# Patient Record
Sex: FEMALE
[De-identification: names, ages, dates, MRNs, and addresses within clinical notes are randomized per-mention and may not be internally consistent; named-entity substitution may affect disease eponyms.]

---

## 2022-01-21 ENCOUNTER — APPOINTMENT (OUTPATIENT)
Dept: OTOLARYNGOLOGY | Facility: CLINIC | Age: 27
End: 2022-01-21

## 2023-10-10 ENCOUNTER — EMERGENCY (EMERGENCY)
Facility: HOSPITAL | Age: 28
LOS: 1 days | Discharge: HOME | End: 2023-10-10
Attending: EMERGENCY MEDICINE
Payer: COMMERCIAL

## 2023-10-10 VITALS
SYSTOLIC BLOOD PRESSURE: 132 MMHG | RESPIRATION RATE: 20 BRPM | HEART RATE: 106 BPM | TEMPERATURE: 98 F | DIASTOLIC BLOOD PRESSURE: 68 MMHG | OXYGEN SATURATION: 97 %

## 2023-10-10 VITALS
OXYGEN SATURATION: 97 % | SYSTOLIC BLOOD PRESSURE: 149 MMHG | HEART RATE: 132 BPM | HEIGHT: 65 IN | TEMPERATURE: 98 F | RESPIRATION RATE: 22 BRPM | DIASTOLIC BLOOD PRESSURE: 99 MMHG | WEIGHT: 130.07 LBS

## 2023-10-10 LAB
ANION GAP SERPL CALC-SCNC: 18 MMOL/L — HIGH (ref 5–17)
BUN SERPL-MCNC: 10 MG/DL — SIGNIFICANT CHANGE UP (ref 7–23)
CALCIUM SERPL-MCNC: 9.8 MG/DL — SIGNIFICANT CHANGE UP (ref 8.4–10.5)
CHLORIDE SERPL-SCNC: 103 MMOL/L — SIGNIFICANT CHANGE UP (ref 96–108)
CO2 SERPL-SCNC: 19 MMOL/L — LOW (ref 22–31)
CREAT SERPL-MCNC: 0.95 MG/DL — SIGNIFICANT CHANGE UP (ref 0.5–1.3)
EGFR: 111 ML/MIN/1.73M2 — SIGNIFICANT CHANGE UP
GLUCOSE SERPL-MCNC: 116 MG/DL — HIGH (ref 70–99)
HCT VFR BLD CALC: 46 % — SIGNIFICANT CHANGE UP (ref 39–50)
HGB BLD-MCNC: 16.3 G/DL — SIGNIFICANT CHANGE UP (ref 13–17)
MCHC RBC-ENTMCNC: 30.1 PG — SIGNIFICANT CHANGE UP (ref 27–34)
MCHC RBC-ENTMCNC: 35.4 GM/DL — SIGNIFICANT CHANGE UP (ref 32–36)
MCV RBC AUTO: 85 FL — SIGNIFICANT CHANGE UP (ref 80–100)
NRBC # BLD: 0 /100 WBCS — SIGNIFICANT CHANGE UP (ref 0–0)
PLATELET # BLD AUTO: 246 K/UL — SIGNIFICANT CHANGE UP (ref 150–400)
POTASSIUM SERPL-MCNC: 3.8 MMOL/L — SIGNIFICANT CHANGE UP (ref 3.5–5.3)
POTASSIUM SERPL-SCNC: 3.8 MMOL/L — SIGNIFICANT CHANGE UP (ref 3.5–5.3)
RBC # BLD: 5.41 M/UL — SIGNIFICANT CHANGE UP (ref 4.2–5.8)
RBC # FLD: 13.1 % — SIGNIFICANT CHANGE UP (ref 10.3–14.5)
SODIUM SERPL-SCNC: 140 MMOL/L — SIGNIFICANT CHANGE UP (ref 135–145)
WBC # BLD: 11.38 K/UL — HIGH (ref 3.8–10.5)
WBC # FLD AUTO: 11.38 K/UL — HIGH (ref 3.8–10.5)

## 2023-10-10 PROCEDURE — 70450 CT HEAD/BRAIN W/O DYE: CPT | Mod: MA

## 2023-10-10 PROCEDURE — 93005 ELECTROCARDIOGRAM TRACING: CPT | Mod: XU

## 2023-10-10 PROCEDURE — 12011 RPR F/E/E/N/L/M 2.5 CM/<: CPT

## 2023-10-10 PROCEDURE — 99285 EMERGENCY DEPT VISIT HI MDM: CPT | Mod: 25

## 2023-10-10 PROCEDURE — 80048 BASIC METABOLIC PNL TOTAL CA: CPT

## 2023-10-10 PROCEDURE — 70450 CT HEAD/BRAIN W/O DYE: CPT | Mod: 26,MA

## 2023-10-10 PROCEDURE — 85027 COMPLETE CBC AUTOMATED: CPT

## 2023-10-10 RX ORDER — LIDOCAINE 4 G/100G
1 CREAM TOPICAL ONCE
Refills: 0 | Status: COMPLETED | OUTPATIENT
Start: 2023-10-10 | End: 2023-10-10

## 2023-10-10 RX ORDER — ACETAMINOPHEN 500 MG
975 TABLET ORAL ONCE
Refills: 0 | Status: COMPLETED | OUTPATIENT
Start: 2023-10-10 | End: 2023-10-10

## 2023-10-10 RX ORDER — IBUPROFEN 200 MG
600 TABLET ORAL ONCE
Refills: 0 | Status: COMPLETED | OUTPATIENT
Start: 2023-10-10 | End: 2023-10-10

## 2023-10-10 RX ORDER — SODIUM CHLORIDE 9 MG/ML
2000 INJECTION INTRAMUSCULAR; INTRAVENOUS; SUBCUTANEOUS ONCE
Refills: 0 | Status: COMPLETED | OUTPATIENT
Start: 2023-10-10 | End: 2023-10-10

## 2023-10-10 RX ADMIN — Medication 975 MILLIGRAM(S): at 03:30

## 2023-10-10 RX ADMIN — Medication 600 MILLIGRAM(S): at 06:48

## 2023-10-10 RX ADMIN — LIDOCAINE 1 PATCH: 4 CREAM TOPICAL at 06:38

## 2023-10-10 RX ADMIN — SODIUM CHLORIDE 2000 MILLILITER(S): 9 INJECTION INTRAMUSCULAR; INTRAVENOUS; SUBCUTANEOUS at 03:30

## 2023-10-10 NOTE — ED ADULT TRIAGE NOTE - WEIGHT METHOD
----- Message from Triny Pimentel MD sent at 12/4/2018  6:53 PM CST -----  Lipid panel is good on present meds, but the liver tests are showing increasing alk phos  Need to add GGTP to eval further    Spoke w/Sophia and added GGTP.  
stated

## 2023-10-10 NOTE — ED PROCEDURE NOTE - ATTENDING CONTRIBUTION TO CARE
I was present in the department during the E/M service provided. I was in the department during the key portions of the service provided. MIKE.

## 2023-10-10 NOTE — ED PROVIDER NOTE - PHYSICAL EXAMINATION
CONSTITUTIONAL: Well appearing, well nourished, awake, alert, oriented to person, place, time/situation and in no apparent distress  ENMT: Airway patent  EYES: Clear bilaterally  CARDIAC: rapid, regular rhythm.  RESPIRATORY: Breath sounds clear and equal bilaterally.  ABDOMEN: Abdomen soft, non-tender, no guarding  MUSCULOSKELETAL: Spine appears normal, no deformities, equal active FROM bilaterally  NEUROLOGIC: CN II-XII grossly intact, moves all extremities without lateralization  SKIN: Exposed skin normal color for race, warm, dry, ~2.5 cm linear simple laceration above right eyebrow
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
## 2023-10-10 NOTE — ED PROVIDER NOTE - CLINICAL SUMMARY MEDICAL DECISION MAKING FREE TEXT BOX
CT head r/o ICH. Laceration repair per note. Sinus tachycardia improved with IV hydration and pain control. No evidence of trauma to trunk.
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
## 2023-10-10 NOTE — ED PROVIDER NOTE - PATIENT PORTAL LINK FT
You can access the FollowMyHealth Patient Portal offered by [facility] by registering at the following website: [email or web address]. By joining [facility]’s FollowMyHealth portal, you will also be able to view your health information using other applications (apps) compatible with our system.

## 2023-10-10 NOTE — ED PROVIDER NOTE - PROGRESS NOTE DETAILS
CT negative for acute injury, pt ambulatory in ED w/o assistance, laceration repaired, medically cleared for DCTH, will give PCP referral. Return precautions, wound care directions, and follow up instructions discussed and provided in ED paperwork. - Lion Hernandez, PGY-3

## 2023-10-10 NOTE — ED PROVIDER NOTE - NSFOLLOWUPINSTRUCTIONS_ED_ALL_ED_FT
Please follow up with your Primary care doctor or return to the emergency department or follow-up at an urgent care to have your SEVEN stitches removed in 5 to 7 days.     You were seen and evaluated in the Emergency Department for your injuries. Clinical examination and history demonstrated no acute evidence of any life-threatening risks to your health as a result of your injuries.    1. Keep the wound clean and dry for 24 hours then gently rinse the wound daily without scrubbing.  Apply neosporin or bacitracin 1-2 times daily.  Change the dressing daily.  2. Return to the ED with new or worsening symptoms including fever, pus, redness, or uncontrolled pain.    You may take 500-1000 mg acetaminophen (tylenol) every 6 hours, as needed for pain.  You may take 600 mg ibuprofen every 8 hours, with food, as needed for pain.  You can take tylenol and ibuprofen at the same time.     CT scan of your head demonstrated no acute fractures, dislocations, or hemorrhage.     While emergent evaluation does not demonstrate any immediate life-threats, we strongly recommend you follow up with primary care doctor for a comprehensive evaluation of your health.     Should you develop nausea, vomiting, worsening headaches, inability to walk properly, numbness or tingling in the extremities, dizziness, or changes to your hearing/vision - please immediately return to the Emergency Department for evaluation of your symptoms.     Should your headaches persist or you develop concussion symptoms (sensitivity to light or noise, nausea, confusion) you can call the following number to schedule an appointment with our Neurology partners:    [facility]  Neurology  [address] [place] [address] [address] [place] [address]  Phone: [phone]

## 2023-10-10 NOTE — ED PROVIDER NOTE - NS ED ROS FT
+head pain    All other ROS negative unless otherwise specified in HPI.     ~Lion Hernandez M.D. Resident